# Patient Record
Sex: FEMALE | Race: WHITE | NOT HISPANIC OR LATINO | Employment: UNEMPLOYED | URBAN - METROPOLITAN AREA
[De-identification: names, ages, dates, MRNs, and addresses within clinical notes are randomized per-mention and may not be internally consistent; named-entity substitution may affect disease eponyms.]

---

## 2020-11-03 ENCOUNTER — APPOINTMENT (OUTPATIENT)
Dept: GENERAL RADIOLOGY | Facility: HOSPITAL | Age: 35
End: 2020-11-03

## 2020-11-03 ENCOUNTER — HOSPITAL ENCOUNTER (EMERGENCY)
Facility: HOSPITAL | Age: 35
Discharge: HOME | End: 2020-11-03
Admitting: EMERGENCY MEDICINE

## 2020-11-03 VITALS
HEART RATE: 87 BPM | RESPIRATION RATE: 15 BRPM | OXYGEN SATURATION: 100 % | TEMPERATURE: 98.7 F | SYSTOLIC BLOOD PRESSURE: 130 MMHG | BODY MASS INDEX: 25.05 KG/M2 | WEIGHT: 159.61 LBS | DIASTOLIC BLOOD PRESSURE: 86 MMHG | HEIGHT: 67 IN

## 2020-11-03 DIAGNOSIS — S61.011A LACERATION OF RIGHT THUMB WITHOUT FOREIGN BODY WITHOUT DAMAGE TO NAIL, INITIAL ENCOUNTER: Primary | ICD-10-CM

## 2020-11-03 PROCEDURE — 25010000003 LIDOCAINE 1 % SOLUTION

## 2020-11-03 PROCEDURE — 90471 IMMUNIZATION ADMIN: CPT | Performed by: NURSE PRACTITIONER

## 2020-11-03 PROCEDURE — 73140 X-RAY EXAM OF FINGER(S): CPT

## 2020-11-03 PROCEDURE — 99282 EMERGENCY DEPT VISIT SF MDM: CPT

## 2020-11-03 PROCEDURE — 90715 TDAP VACCINE 7 YRS/> IM: CPT | Performed by: NURSE PRACTITIONER

## 2020-11-03 PROCEDURE — 25010000002 TDAP 5-2.5-18.5 LF-MCG/0.5 SUSPENSION: Performed by: NURSE PRACTITIONER

## 2020-11-03 RX ADMIN — TETANUS TOXOID, REDUCED DIPHTHERIA TOXOID AND ACELLULAR PERTUSSIS VACCINE, ADSORBED 0.5 ML: 5; 2.5; 8; 8; 2.5 SUSPENSION INTRAMUSCULAR at 19:48

## 2020-11-04 NOTE — ED PROVIDER NOTES
"Subjective   35-year-old female presents with complaint of laceration to the dorsum of her proximal right thumb that extends into the MIP status post \"a glass tomorrow scaring shattered\".  She reports that she had pressure washed the wound with tap water.  She is unsure of her last tetanus shot.  She denies any paresthesias.    1. Location: Dorsum of the proximal right thumb  2. Quality: Throbbing  3. Severity: Moderate  4. Worsening factors: Palpation  5. Alleviating factors: Rest  6. Onset: Heart arrival  7. Radiation: Denies  8. Frequency: intermittent  9. Co-morbidities: No past medical history on file.  10. Source: Patient            Review of Systems   Musculoskeletal: Positive for myalgias. Negative for arthralgias and gait problem.   Skin: Negative for color change, pallor, rash and wound.   Neurological: Negative for weakness and numbness.   Hematological: Does not bruise/bleed easily.   All other systems reviewed and are negative.      No past medical history on file.    No Known Allergies    No past surgical history on file.    No family history on file.    Social History     Socioeconomic History   • Marital status:      Spouse name: Not on file   • Number of children: Not on file   • Years of education: Not on file   • Highest education level: Not on file           Objective   Physical Exam  Vitals signs and nursing note reviewed.   Constitutional:       General: She is awake. She is not in acute distress.     Appearance: Normal appearance. She is well-developed and normal weight.   Cardiovascular:      Pulses: Normal pulses.   Musculoskeletal: Normal range of motion.         General: Tenderness and signs of injury present. No deformity.      Right hand: She exhibits tenderness and laceration. She exhibits normal range of motion, no bony tenderness, normal two-point discrimination, normal capillary refill, no deformity and no swelling. Normal sensation noted. Normal strength noted.        " Hands:       Comments: No mallet deformity noted.  Sensation intact.  Cap refill less than 2 seconds.  Pulses strong and equal bilaterally 2+.   Skin:     General: Skin is warm and dry.      Capillary Refill: Capillary refill takes less than 2 seconds.      Coloration: Skin is not pale.   Neurological:      Mental Status: She is alert and oriented to person, place, and time.      Sensory: No sensory deficit.      Motor: No abnormal muscle tone.   Psychiatric:         Mood and Affect: Mood normal.         Behavior: Behavior normal. Behavior is cooperative.         Thought Content: Thought content normal.         Judgment: Judgment normal.         Laceration Repair    Date/Time: 11/3/2020 8:25 PM  Performed by: Laine Dexter NP  Authorized by: Laine Dexter NP     Consent:     Consent obtained:  Verbal    Consent given by:  Patient    Risks discussed:  Pain  Anesthesia (see MAR for exact dosages):     Anesthesia method:  Nerve block and local infiltration    Local anesthetic:  Lidocaine 1% w/o epi    Block needle gauge:  27 G    Block anesthetic:  Lidocaine 1% w/o epi    Block injection procedure:  Anatomic landmarks identified    Block outcome:  Incomplete block  Laceration details:     Length (cm):  3.5  Repair type:     Repair type:  Simple  Pre-procedure details:     Preparation:  Imaging obtained to evaluate for foreign bodies  Exploration:     Hemostasis achieved with:  Direct pressure    Wound exploration: wound explored through full range of motion      Contaminated: no    Treatment:     Area cleansed with:  Betadine    Amount of cleaning:  Standard    Irrigation solution:  Sterile saline    Irrigation method:  Syringe    Visualized foreign bodies/material removed: no    Skin repair:     Repair method:  Sutures    Suture size:  5-0    Suture material:  Nylon    Suture technique:  Simple interrupted    Number of sutures:  7  Approximation:     Approximation:  Close  Post-procedure details:     Dressing:   "Antibiotic ointment and splint for protection    Patient tolerance of procedure:  Tolerated well, no immediate complications               ED Course      Xr Finger 2+ View Right    Result Date: 11/3/2020   1.  Laceration of the proximal phalanx of the first digit.  No acute bony abnormality or radiopaque foreign body identified.  Electronically Signed By-Segundo Nur On:11/3/2020 7:57 PM This report was finalized on [id number] by  Segundo Nur, .    Medications   Tdap (BOOSTRIX) injection 0.5 mL (0.5 mL Intramuscular Given 11/3/20 1948)     Labs Reviewed - No data to display                                       MDM  Number of Diagnoses or Management Options  Diagnosis management comments: Chart Review: Nothing available for comparison.  Comorbidity: No past medical history on file.  Imaging: Was interpreted by physician and reviewed by myself: Xr Finger 2+ View Right    Result Date: 11/3/2020   1.  Laceration of the proximal phalanx of the first digit.  No acute bony abnormality or radiopaque foreign body identified.  Electronically Signed By-Segundo Nur On:11/3/2020 7:57 PM This report was finalized on [id number] by  Segundo Nur, .    Patient undressed and placed in gown for exam.  Appropriate PPE worn during patient exam. 35-year-old female presents with complaint of laceration to the dorsum of her proximal right thumb that extends into the MIP status post \"a glass tomorrow scaring shattered\".  She reports that she had pressure washed the wound with tap water.  She is unsure of her last tetanus shot.  She denies any paresthesias. No mallet deformity noted.  Sensation intact.  Cap refill less than 2 seconds.  Pulses strong and equal bilaterally 2+.  X-ray obtained of the right thumb to rule out foreign body retention with the above findings.  See procedure for suture repair.  Patient had nonadherent bandage and thumb splint applied for protection.  She instructed to unstressed with antibacterial soap " and water, and have sutures removed in 10 to 14 days.     Disposition/Treatment: Discussed results with patient, verbalized understanding.  Discussed reasons to return to the ER, patient verbalized understanding.  Agreeable with plan of care.  Patient was stable upon discharge.    EMR Dragon transcription disclaimer:  Some of this encounter note is an electronic transcription translation of spoken language to printed text. The electronic translation of spoken language may permit erroneous, or at times, nonsensical words or phrases to be inadvertently transcribed; Although I have reviewed the note for such errors some may still exist.              Amount and/or Complexity of Data Reviewed  Tests in the radiology section of CPT®: reviewed    Patient Progress  Patient progress: stable      Final diagnoses:   Laceration of right thumb without foreign body without damage to nail, initial encounter            Laine Dexter NP  11/03/20 [id number]

## 2020-11-04 NOTE — DISCHARGE INSTRUCTIONS
Cleanse twice daily with antibacterial soap and water, then apply bandage.  Wear splint as directed.  Wound check by primary care physician in the next 2 days.  Have sutures removed in 10 to 14 days.  Return to the ER for any new or worsening symptoms.